# Patient Record
Sex: FEMALE | Race: WHITE | NOT HISPANIC OR LATINO | URBAN - METROPOLITAN AREA
[De-identification: names, ages, dates, MRNs, and addresses within clinical notes are randomized per-mention and may not be internally consistent; named-entity substitution may affect disease eponyms.]

---
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
## 2024-05-30 ENCOUNTER — APPOINTMENT (OUTPATIENT)
Dept: PRIMARY CARE | Facility: CLINIC | Age: 19
End: 2024-05-30
Payer: COMMERCIAL

## 2025-06-02 ENCOUNTER — APPOINTMENT (OUTPATIENT)
Dept: PRIMARY CARE | Facility: CLINIC | Age: 20
End: 2025-06-02
Payer: COMMERCIAL

## 2025-06-02 VITALS
HEIGHT: 64 IN | WEIGHT: 163.3 LBS | BODY MASS INDEX: 27.88 KG/M2 | SYSTOLIC BLOOD PRESSURE: 116 MMHG | DIASTOLIC BLOOD PRESSURE: 68 MMHG | RESPIRATION RATE: 18 BRPM | TEMPERATURE: 98.4 F | OXYGEN SATURATION: 99 %

## 2025-06-02 DIAGNOSIS — F41.9 ANXIETY: ICD-10-CM

## 2025-06-02 DIAGNOSIS — F32.A DEPRESSION, UNSPECIFIED DEPRESSION TYPE: ICD-10-CM

## 2025-06-02 DIAGNOSIS — Z00.00 HEALTH MAINTENANCE EXAMINATION: Primary | ICD-10-CM

## 2025-06-02 PROBLEM — A04.72 COLITIS DUE TO CLOSTRIDIOIDES DIFFICILE: Status: ACTIVE | Noted: 2025-06-02

## 2025-06-02 PROBLEM — E78.00 HYPERCHOLESTEROLEMIA: Status: ACTIVE | Noted: 2025-06-02

## 2025-06-02 PROBLEM — R53.83 FATIGUE: Status: ACTIVE | Noted: 2025-06-02

## 2025-06-02 PROBLEM — L98.9 SKIN LESION: Status: ACTIVE | Noted: 2025-06-02

## 2025-06-02 PROBLEM — E55.9 VITAMIN D DEFICIENCY: Status: ACTIVE | Noted: 2025-06-02

## 2025-06-02 PROBLEM — H00.019 HORDEOLUM EXTERNUM OF LOWER EYELID: Status: ACTIVE | Noted: 2025-06-02

## 2025-06-02 PROCEDURE — 99385 PREV VISIT NEW AGE 18-39: CPT | Performed by: FAMILY MEDICINE

## 2025-06-02 PROCEDURE — 3008F BODY MASS INDEX DOCD: CPT | Performed by: FAMILY MEDICINE

## 2025-06-02 RX ORDER — GABAPENTIN 300 MG/1
1 CAPSULE ORAL
COMMUNITY
Start: 2025-05-18

## 2025-06-02 RX ORDER — ARIPIPRAZOLE 30 MG/1
1 TABLET ORAL
COMMUNITY
Start: 2025-03-19

## 2025-06-02 RX ORDER — VENLAFAXINE HYDROCHLORIDE 75 MG/1
75 CAPSULE, EXTENDED RELEASE ORAL DAILY
COMMUNITY
Start: 2025-03-19

## 2025-06-02 RX ORDER — VENLAFAXINE HYDROCHLORIDE 150 MG/1
150 CAPSULE, EXTENDED RELEASE ORAL DAILY
COMMUNITY
Start: 2025-03-20

## 2025-06-02 ASSESSMENT — ENCOUNTER SYMPTOMS
DEPRESSION: 0
LOSS OF SENSATION IN FEET: 0
OCCASIONAL FEELINGS OF UNSTEADINESS: 0

## 2025-06-02 ASSESSMENT — COLUMBIA-SUICIDE SEVERITY RATING SCALE - C-SSRS
6. HAVE YOU EVER DONE ANYTHING, STARTED TO DO ANYTHING, OR PREPARED TO DO ANYTHING TO END YOUR LIFE?: NO
1. IN THE PAST MONTH, HAVE YOU WISHED YOU WERE DEAD OR WISHED YOU COULD GO TO SLEEP AND NOT WAKE UP?: NO
2. HAVE YOU ACTUALLY HAD ANY THOUGHTS OF KILLING YOURSELF?: NO

## 2025-06-02 ASSESSMENT — PATIENT HEALTH QUESTIONNAIRE - PHQ9
2. FEELING DOWN, DEPRESSED OR HOPELESS: SEVERAL DAYS
SUM OF ALL RESPONSES TO PHQ9 QUESTIONS 1 AND 2: 2
1. LITTLE INTEREST OR PLEASURE IN DOING THINGS: SEVERAL DAYS

## 2025-06-02 NOTE — PROGRESS NOTES
"Subjective   Patient ID: Mehreen Kuo is a 19 y.o. female who presents for Establish Care (NPV, WWE - No Pap/Mom has blood work from pt.'s psychiatrist they want to review with ).    History of Present Illness    Mehreen Kuo is a 19 year old female with OCD, anxiety, and ADHD who presents for a routine checkup and review of blood work results.    She has been taking Abilify 30 mg daily since 2020 for her OCD, anxiety, and ADHD. Sees psychiatrist Dr. Peace Cuellar. She is concerned about weight gain since starting Abilify, noting that the weight gain was gradual and may have stabilized. Her mother recalls she was very thin before starting the medication. She does not engage in regular exercise but works at a barn once or twice a week, which involves physical activity.    Recent blood work shows elevated cholesterol and glucose levels, with total cholesterol at 219 mg/dL, glucose at 108 mg/dL, and triglycerides at 261 mg/dL. There is a family history of high cholesterol and heart disease.  Her diet includes a high intake of carbohydrates and sugar, with a preference for white rice and pasta over whole grain options.    She experiences irregular menstrual cycles, which are heavy but not painful.     Objective     /68 (BP Location: Left arm, Patient Position: Sitting)   Temp 36.9 °C (98.4 °F) (Oral)   Resp 18   Ht 1.633 m (5' 4.29\")   Wt 74.1 kg (163 lb 4.8 oz)   LMP 05/27/2025 (Approximate)   SpO2 99%   BMI 27.78 kg/m²      Physical Exam  Alert, well-appearing.    HEENT: OP clear. Sclera white. Pupils equal and round. EACs and TMs clear.    Neck: Supple. No palpable masses.     CVS: RRR, no murmurs.     Respiratory: Clear and equal breath sounds.    GI: Soft, nontender, no masses or hepatosplenomegaly.     Assessment/Plan     Diagnoses and all orders for this visit:  Health maintenance examination  Anxiety  Depression, unspecified depression type    Assessment & Plan  Anxiety and OCD  Managed with " Abilify.   - Encourage lifestyle modifications, including diet and exercise, to manage weight.    Hyperlipidemia  Cholesterol 219 mg/dL, triglycerides 261 mg/dL. Likely due to Abilify and diet. Family history noted. Benefits of Abilify outweigh risks.  - Encourage dietary modifications to reduce carbohydrate intake, substituting white rice and pasta with whole grains.  - Recheck lipid panel in 6 months.    Elevated blood glucose  Slightly elevated at 108 mg/dL, likely related to diet and weight gain.  - Encourage dietary modifications to reduce carbohydrate intake.  - Booklets on 'Eating to feel your best', 'Making healthy food choices', 'Being active to feel your best' given to patient   - Recheck blood glucose in 6 months.    Vitamin D deficiency  Supplementation recommended.      Rhea Muñoz MD    This medical note was created with the assistance of artificial intelligence (AI) for documentation purposes. The content has been reviewed and confirmed by the healthcare provider for accuracy and completeness. Patient consented to the use of audio recording and use of AI during their visit.